# Patient Record
Sex: FEMALE | Race: WHITE | URBAN - METROPOLITAN AREA
[De-identification: names, ages, dates, MRNs, and addresses within clinical notes are randomized per-mention and may not be internally consistent; named-entity substitution may affect disease eponyms.]

---

## 2017-06-05 ENCOUNTER — EMERGENCY (EMERGENCY)
Facility: HOSPITAL | Age: 22
LOS: 1 days | Discharge: HOME | End: 2017-06-05
Admitting: EMERGENCY MEDICINE
Payer: COMMERCIAL

## 2017-06-05 VITALS
TEMPERATURE: 98 F | WEIGHT: 164.91 LBS | HEIGHT: 70 IN | DIASTOLIC BLOOD PRESSURE: 79 MMHG | RESPIRATION RATE: 14 BRPM | HEART RATE: 94 BPM | OXYGEN SATURATION: 100 % | SYSTOLIC BLOOD PRESSURE: 127 MMHG

## 2017-06-05 LAB
ALBUMIN SERPL ELPH-MCNC: 4.7 G/DL — SIGNIFICANT CHANGE UP (ref 3.3–5)
ALP SERPL-CCNC: 51 U/L — SIGNIFICANT CHANGE UP (ref 40–120)
ALT FLD-CCNC: 16 U/L — SIGNIFICANT CHANGE UP (ref 4–33)
APPEARANCE UR: SIGNIFICANT CHANGE UP
AST SERPL-CCNC: 22 U/L — SIGNIFICANT CHANGE UP (ref 4–32)
BACTERIA # UR AUTO: SIGNIFICANT CHANGE UP
BASE EXCESS BLDV CALC-SCNC: 1.6 MMOL/L — SIGNIFICANT CHANGE UP
BASOPHILS # BLD AUTO: 0.04 K/UL — SIGNIFICANT CHANGE UP (ref 0–0.2)
BASOPHILS NFR BLD AUTO: 0.6 % — SIGNIFICANT CHANGE UP (ref 0–2)
BILIRUB SERPL-MCNC: 0.6 MG/DL — SIGNIFICANT CHANGE UP (ref 0.2–1.2)
BILIRUB UR-MCNC: NEGATIVE — SIGNIFICANT CHANGE UP
BLOOD GAS VENOUS - CREATININE: 0.88 MG/DL — SIGNIFICANT CHANGE UP (ref 0.5–1.3)
BLOOD UR QL VISUAL: NEGATIVE — SIGNIFICANT CHANGE UP
BUN SERPL-MCNC: 10 MG/DL — SIGNIFICANT CHANGE UP (ref 7–23)
CALCIUM SERPL-MCNC: 9.3 MG/DL — SIGNIFICANT CHANGE UP (ref 8.4–10.5)
CHLORIDE BLDV-SCNC: 112 MMOL/L — HIGH (ref 96–108)
CHLORIDE SERPL-SCNC: 104 MMOL/L — SIGNIFICANT CHANGE UP (ref 98–107)
CO2 SERPL-SCNC: 23 MMOL/L — SIGNIFICANT CHANGE UP (ref 22–31)
COLOR SPEC: YELLOW — SIGNIFICANT CHANGE UP
CREAT SERPL-MCNC: 0.92 MG/DL — SIGNIFICANT CHANGE UP (ref 0.5–1.3)
EOSINOPHIL # BLD AUTO: 0.1 K/UL — SIGNIFICANT CHANGE UP (ref 0–0.5)
EOSINOPHIL NFR BLD AUTO: 1.4 % — SIGNIFICANT CHANGE UP (ref 0–6)
ERYTHROCYTE [SEDIMENTATION RATE] IN BLOOD: 3 MM/HR — LOW (ref 4–25)
GAS PNL BLDV: 133 MMOL/L — LOW (ref 136–146)
GLUCOSE BLDV-MCNC: 88 — SIGNIFICANT CHANGE UP (ref 70–99)
GLUCOSE SERPL-MCNC: 86 MG/DL — SIGNIFICANT CHANGE UP (ref 70–99)
GLUCOSE UR-MCNC: NEGATIVE — SIGNIFICANT CHANGE UP
HCO3 BLDV-SCNC: 24 MMOL/L — SIGNIFICANT CHANGE UP (ref 20–27)
HCT VFR BLD CALC: 43.3 % — SIGNIFICANT CHANGE UP (ref 34.5–45)
HCT VFR BLDV CALC: 45.8 % — HIGH (ref 34.5–45)
HGB BLD-MCNC: 14.6 G/DL — SIGNIFICANT CHANGE UP (ref 11.5–15.5)
HGB BLDV-MCNC: 14.9 G/DL — SIGNIFICANT CHANGE UP (ref 11.5–15.5)
IMM GRANULOCYTES NFR BLD AUTO: 0.1 % — SIGNIFICANT CHANGE UP (ref 0–1.5)
KETONES UR-MCNC: NEGATIVE — SIGNIFICANT CHANGE UP
LACTATE BLDV-MCNC: 1 MMOL/L — SIGNIFICANT CHANGE UP (ref 0.5–2)
LEUKOCYTE ESTERASE UR-ACNC: NEGATIVE — SIGNIFICANT CHANGE UP
LIDOCAIN IGE QN: 58.8 U/L — SIGNIFICANT CHANGE UP (ref 7–60)
LYMPHOCYTES # BLD AUTO: 2.93 K/UL — SIGNIFICANT CHANGE UP (ref 1–3.3)
LYMPHOCYTES # BLD AUTO: 42.3 % — SIGNIFICANT CHANGE UP (ref 13–44)
MCHC RBC-ENTMCNC: 29 PG — SIGNIFICANT CHANGE UP (ref 27–34)
MCHC RBC-ENTMCNC: 33.7 % — SIGNIFICANT CHANGE UP (ref 32–36)
MCV RBC AUTO: 85.9 FL — SIGNIFICANT CHANGE UP (ref 80–100)
MONOCYTES # BLD AUTO: 0.59 K/UL — SIGNIFICANT CHANGE UP (ref 0–0.9)
MONOCYTES NFR BLD AUTO: 8.5 % — SIGNIFICANT CHANGE UP (ref 2–14)
MUCOUS THREADS # UR AUTO: SIGNIFICANT CHANGE UP
NEUTROPHILS # BLD AUTO: 3.25 K/UL — SIGNIFICANT CHANGE UP (ref 1.8–7.4)
NEUTROPHILS NFR BLD AUTO: 47.1 % — SIGNIFICANT CHANGE UP (ref 43–77)
NITRITE UR-MCNC: NEGATIVE — SIGNIFICANT CHANGE UP
PCO2 BLDV: 47 MMHG — SIGNIFICANT CHANGE UP (ref 41–51)
PH BLDV: 7.37 PH — SIGNIFICANT CHANGE UP (ref 7.32–7.43)
PH UR: 6 — SIGNIFICANT CHANGE UP (ref 4.6–8)
PLATELET # BLD AUTO: 273 K/UL — SIGNIFICANT CHANGE UP (ref 150–400)
PMV BLD: 10.6 FL — SIGNIFICANT CHANGE UP (ref 7–13)
PO2 BLDV: 32 MMHG — LOW (ref 35–40)
POTASSIUM BLDV-SCNC: 3.6 MMOL/L — SIGNIFICANT CHANGE UP (ref 3.4–4.5)
POTASSIUM SERPL-MCNC: 4 MMOL/L — SIGNIFICANT CHANGE UP (ref 3.5–5.3)
POTASSIUM SERPL-SCNC: 4 MMOL/L — SIGNIFICANT CHANGE UP (ref 3.5–5.3)
PROT SERPL-MCNC: 7.4 G/DL — SIGNIFICANT CHANGE UP (ref 6–8.3)
PROT UR-MCNC: 30 — HIGH
RBC # BLD: 5.04 M/UL — SIGNIFICANT CHANGE UP (ref 3.8–5.2)
RBC # FLD: 12.5 % — SIGNIFICANT CHANGE UP (ref 10.3–14.5)
RBC CASTS # UR COMP ASSIST: SIGNIFICANT CHANGE UP (ref 0–?)
SAO2 % BLDV: 53.3 % — LOW (ref 60–85)
SODIUM SERPL-SCNC: 142 MMOL/L — SIGNIFICANT CHANGE UP (ref 135–145)
SP GR SPEC: 1.02 — SIGNIFICANT CHANGE UP (ref 1–1.03)
SQUAMOUS # UR AUTO: SIGNIFICANT CHANGE UP
UROBILINOGEN FLD QL: NORMAL E.U. — SIGNIFICANT CHANGE UP (ref 0.1–0.2)
WBC # BLD: 6.92 K/UL — SIGNIFICANT CHANGE UP (ref 3.8–10.5)
WBC # FLD AUTO: 6.92 K/UL — SIGNIFICANT CHANGE UP (ref 3.8–10.5)
WBC UR QL: SIGNIFICANT CHANGE UP (ref 0–?)

## 2017-06-05 PROCEDURE — 99284 EMERGENCY DEPT VISIT MOD MDM: CPT

## 2017-06-05 PROCEDURE — 76705 ECHO EXAM OF ABDOMEN: CPT | Mod: 26

## 2017-06-05 PROCEDURE — 74177 CT ABD & PELVIS W/CONTRAST: CPT | Mod: 26

## 2017-06-05 RX ORDER — SODIUM CHLORIDE 9 MG/ML
1000 INJECTION INTRAMUSCULAR; INTRAVENOUS; SUBCUTANEOUS ONCE
Qty: 0 | Refills: 0 | Status: COMPLETED | OUTPATIENT
Start: 2017-06-05 | End: 2017-06-05

## 2017-06-05 RX ADMIN — SODIUM CHLORIDE 1000 MILLILITER(S): 9 INJECTION INTRAMUSCULAR; INTRAVENOUS; SUBCUTANEOUS at 13:36

## 2017-06-05 NOTE — ED PROVIDER NOTE - OBJECTIVE STATEMENT
21 yo F no pmhx here for abdominal pain x 5 days. Pt states she has a history of constipation which she had been constipated for the past 5 days. Two days ago had several large bowel movements and then felt better however then pain returned localized to the RLQ. Went to PMD today and was told to go to ED for r/o appendicitis. Pt has been able to tolerate PO both solids and liquids. LMP 5/13. Reports more frequent urination. Otherwise well. Denies fever, chills, vomiting, nausea, diarrhea, weakness, HA, difficulty breathing, SOB, CP, blood in stool, burning with urination, blood in urine.

## 2017-06-05 NOTE — ED PROVIDER NOTE - CARE PLAN
Principal Discharge DX:	Abdominal pain Principal Discharge DX:	Abdominal pain  Instructions for follow-up, activity and diet:	Follow up with GI. Rest, drink plenty of fluids.  Advance activity as tolerated.  Continue all previously prescribed medications as directed. You can use motrin 600mg every 6-8 hours for pain or fever, and/or Tylenol 650 mg every 4 hours for pain/fever. Follow up with your primary care physician in 48-72 hours- bring copies of your results.  Return to the emergency department for chest pain, shortness of breath, dizziness, or worsening, concerning, or persistent symptoms.

## 2017-06-05 NOTE — ED PROVIDER NOTE - CHPI ED SYMPTOMS NEG
no fever/no burning urination/no diarrhea/no vomiting/no nausea/no abdominal distension/no dysuria/no hematuria/no chills/no blood in stool

## 2017-06-05 NOTE — ED PROVIDER NOTE - PROGRESS NOTE DETAILS
TYRESE Cline: pt doing well, has not required meds. Abdominal exam now benign. CT negative. Right corpus leuteal visualized. Otherwise well. Will d/c with GI follow up. Pt and family agree with plan.

## 2017-06-05 NOTE — ED PROVIDER NOTE - PLAN OF CARE
Follow up with GI. Rest, drink plenty of fluids.  Advance activity as tolerated.  Continue all previously prescribed medications as directed. You can use motrin 600mg every 6-8 hours for pain or fever, and/or Tylenol 650 mg every 4 hours for pain/fever. Follow up with your primary care physician in 48-72 hours- bring copies of your results.  Return to the emergency department for chest pain, shortness of breath, dizziness, or worsening, concerning, or persistent symptoms.

## 2017-06-05 NOTE — ED ADULT NURSE NOTE - OBJECTIVE STATEMENT
pt received a&ox3, c/o constipation since Friday and lower abdominal pain, currently denies pain, sent in from pediatricians office, pt denies ha/cp/sob/nv/urinary symptoms, 20 gauge IV placed in left ac, labs drawn and sent, will continue to monitor.

## 2017-06-05 NOTE — ED ADULT TRIAGE NOTE - CHIEF COMPLAINT QUOTE
Pt c/o of diffuse abdominal pain since Wednesday she was sent in by her PMD to R/O AP. Pt appears to be in no acute distress abdomen is soft non tender.

## 2017-06-05 NOTE — ED PROVIDER NOTE - GASTROINTESTINAL, MLM
Abdomen soft, non-tender, no guarding. minimal RLQ tenderness. no rebound. no guarding, no psoas sign.

## 2017-06-06 LAB
BACTERIA UR CULT: SIGNIFICANT CHANGE UP
SPECIMEN SOURCE: SIGNIFICANT CHANGE UP

## 2019-04-18 ENCOUNTER — RECORD ABSTRACTING (OUTPATIENT)
Age: 24
End: 2019-04-18

## 2019-04-18 ENCOUNTER — APPOINTMENT (OUTPATIENT)
Dept: OBGYN | Facility: CLINIC | Age: 24
End: 2019-04-18
Payer: COMMERCIAL

## 2019-04-18 VITALS
BODY MASS INDEX: 29.2 KG/M2 | DIASTOLIC BLOOD PRESSURE: 70 MMHG | HEIGHT: 70 IN | WEIGHT: 204 LBS | SYSTOLIC BLOOD PRESSURE: 122 MMHG

## 2019-04-18 DIAGNOSIS — Z87.898 PERSONAL HISTORY OF OTHER SPECIFIED CONDITIONS: ICD-10-CM

## 2019-04-18 DIAGNOSIS — Z87.42 PERSONAL HISTORY OF OTHER DISEASES OF THE FEMALE GENITAL TRACT: ICD-10-CM

## 2019-04-18 DIAGNOSIS — Z83.3 FAMILY HISTORY OF DIABETES MELLITUS: ICD-10-CM

## 2019-04-18 DIAGNOSIS — Z82.62 FAMILY HISTORY OF OSTEOPOROSIS: ICD-10-CM

## 2019-04-18 DIAGNOSIS — Z82.49 FAMILY HISTORY OF ISCHEMIC HEART DISEASE AND OTHER DISEASES OF THE CIRCULATORY SYSTEM: ICD-10-CM

## 2019-04-18 DIAGNOSIS — Z87.19 PERSONAL HISTORY OF OTHER DISEASES OF THE DIGESTIVE SYSTEM: ICD-10-CM

## 2019-04-18 DIAGNOSIS — Z01.419 ENCOUNTER FOR GYNECOLOGICAL EXAMINATION (GENERAL) (ROUTINE) W/OUT ABNORMAL FINDINGS: ICD-10-CM

## 2019-04-18 DIAGNOSIS — F41.9 ANXIETY DISORDER, UNSPECIFIED: ICD-10-CM

## 2019-04-18 DIAGNOSIS — Z80.3 FAMILY HISTORY OF MALIGNANT NEOPLASM OF BREAST: ICD-10-CM

## 2019-04-18 DIAGNOSIS — Z92.89 PERSONAL HISTORY OF OTHER MEDICAL TREATMENT: ICD-10-CM

## 2019-04-18 PROCEDURE — 81025 URINE PREGNANCY TEST: CPT

## 2019-04-18 PROCEDURE — 81003 URINALYSIS AUTO W/O SCOPE: CPT | Mod: QW

## 2019-04-18 PROCEDURE — 99395 PREV VISIT EST AGE 18-39: CPT

## 2019-04-19 PROBLEM — Z80.3 FAMILY HISTORY OF MALIGNANT NEOPLASM OF BREAST: Status: ACTIVE | Noted: 2019-04-18

## 2019-04-19 PROBLEM — Z82.49 FAMILY HISTORY OF HYPERTENSION: Status: ACTIVE | Noted: 2019-04-18

## 2019-04-19 LAB
BILIRUB UR QL STRIP: NORMAL
C TRACH RRNA SPEC QL NAA+PROBE: NOT DETECTED
GLUCOSE UR-MCNC: NORMAL
HCG UR QL: 1 EU/DL
HCG UR QL: NEGATIVE
HGB UR QL STRIP.AUTO: NORMAL
HPV HIGH+LOW RISK DNA PNL CVX: NOT DETECTED
KETONES UR-MCNC: NORMAL
LEUKOCYTE ESTERASE UR QL STRIP: NORMAL
N GONORRHOEA RRNA SPEC QL NAA+PROBE: NOT DETECTED
NITRITE UR QL STRIP: NORMAL
PH UR STRIP: 7
PROT UR STRIP-MCNC: NORMAL
QUALITY CONTROL: YES
SOURCE AMPLIFICATION: NORMAL
SOURCE TP AMPLIFICATION: NORMAL
SP GR UR STRIP: 1.01
T VAGINALIS RRNA SPEC QL NAA+PROBE: NOT DETECTED

## 2019-04-19 NOTE — PHYSICAL EXAM
[Awake] : awake [Alert] : alert [Mass] : no breast mass [Axillary LAD] : no axillary lymphadenopathy [Nipple Discharge] : no nipple discharge [Distended] : not distended [Tender] : non tender [Soft] : soft [Oriented x3] : oriented to person, place, and time [Normal] : uterus [Motion Tenderness] : there was no cervical motion tenderness [Tenderness] : nontender [Uterine Adnexae] : were not tender and not enlarged [Mass ___ cm] : no uterine mass was palpated [Enlarged ___ wks] : not enlarged [Ovarian Mass (___ Cm)] : there were no adnexal masses [Adnexa Tenderness] : were not tender

## 2019-04-19 NOTE — DISCUSSION/SUMMARY
[FreeTextEntry1] : We discussed the recommendations for breast cancer screening for average risk patients beginning at age 40.  Sima does have a family history of breast cancer, but her mother has never been diagnosed with breast cancer. We discussed based on the current guidelines Sima would not be due for screening mammography until age 40, and generally mammograms are not recommended under 30. Sima and her mother were advised to consult with Dr. Alcazar regarding her recommendations for testing prior to the breast reduction, and they were also offered to establish with a breast surgeon for further discussion regarding Sima's individual breast cancer risk which may modify her screening recommendations for the future.  They were ultimately provided with the rx for the mammo and sonogram- but were advised these are generally not recommended and to wait until her discussion with Dr. Alcazar.\par \par 2.  Sima declined contraceptive options today/denied sexual activity. we discussed the importance of condom use when she does initiate sexual activity.

## 2019-04-23 LAB — CYTOLOGY CVX/VAG DOC THIN PREP: NORMAL

## 2019-05-15 ENCOUNTER — TRANSCRIPTION ENCOUNTER (OUTPATIENT)
Age: 24
End: 2019-05-15

## 2020-01-24 ENCOUNTER — TRANSCRIPTION ENCOUNTER (OUTPATIENT)
Age: 25
End: 2020-01-24

## 2020-01-31 ENCOUNTER — TRANSCRIPTION ENCOUNTER (OUTPATIENT)
Age: 25
End: 2020-01-31

## 2020-05-14 ENCOUNTER — TRANSCRIPTION ENCOUNTER (OUTPATIENT)
Age: 25
End: 2020-05-14

## 2020-06-14 ENCOUNTER — TRANSCRIPTION ENCOUNTER (OUTPATIENT)
Age: 25
End: 2020-06-14

## 2020-12-21 PROBLEM — Z01.419 ENCOUNTER FOR WELL WOMAN EXAM WITH ROUTINE GYNECOLOGICAL EXAM: Status: RESOLVED | Noted: 2019-04-18 | Resolved: 2020-12-21

## 2021-01-06 ENCOUNTER — APPOINTMENT (OUTPATIENT)
Dept: OBGYN | Facility: CLINIC | Age: 26
End: 2021-01-06
Payer: COMMERCIAL

## 2021-01-06 VITALS
DIASTOLIC BLOOD PRESSURE: 72 MMHG | SYSTOLIC BLOOD PRESSURE: 118 MMHG | TEMPERATURE: 97.2 F | BODY MASS INDEX: 35.79 KG/M2 | HEIGHT: 70 IN | WEIGHT: 250 LBS

## 2021-01-06 PROCEDURE — 99214 OFFICE O/P EST MOD 30 MIN: CPT

## 2021-01-06 PROCEDURE — 99072 ADDL SUPL MATRL&STAF TM PHE: CPT

## 2021-01-06 NOTE — HISTORY OF PRESENT ILLNESS
[Last Pap ___] : Last cervical pap smear was [unfilled] [Reproductive Age] : is of reproductive age [Menarche Age: ____] : age at menarche was [unfilled] [Menstrual Problems] : reports abnormal menses [Lighter Bleeding] : bleeding has been lighter than normal [Definite:  ___ (Date)] : the last menstrual period was [unfilled] [Pregnancy History] : denies prior pregnancies [Sexually Active] : is not sexually active [Contraception] : does not use contraception

## 2021-01-06 NOTE — DISCUSSION/SUMMARY
[FreeTextEntry1] : I, Eric Thomas, acted solely as a scribe for Dr. Soriano on this date 01/06/2021.\par All medical record entries made by the Scribe were at my, Dr. Sorinao's direction and personally dictated by me on  01/06/2021. I have reviewed the chart and agree that the record accurately reflects my personal performance of the history, physical exam, assessment and plan. I have also personally directed, reviewed, and agreed with the chart.\par \par

## 2021-01-06 NOTE — CHIEF COMPLAINT
[Urgent Visit] : Urgent Visit [FreeTextEntry1] : Patient presents for evaluation of possible retained tampon

## 2021-01-06 NOTE — REVIEW OF SYSTEMS
[Nl] : Hematologic/Lymphatic [Recent Wt Gain ___ Lbs] : recent [unfilled] ~Ulb weight gain [Headache] : headache [Anxiety] : anxiety [Depression] : depression [Excessive Sweating] : excessive sweating [FreeTextEntry1] : excessive thirst

## 2021-01-06 NOTE — PHYSICAL EXAM
[Awake] : awake [Alert] : alert [Oriented x3] : oriented to person, place, and time [Labia Majora] : labia major [Labia Minora] : labia minora [Normal] : clitoris [Scant] : there was scant vaginal bleeding [Uterine Adnexae] : were not tender and not enlarged [Acute Distress] : no acute distress [Depressed Mood] : not depressed [Flat Affect] : affect not flat [FreeTextEntry4] : currently on her menses, no tampon noted

## 2021-01-20 ENCOUNTER — APPOINTMENT (OUTPATIENT)
Dept: OBGYN | Facility: CLINIC | Age: 26
End: 2021-01-20
Payer: COMMERCIAL

## 2021-01-20 ENCOUNTER — ASOB RESULT (OUTPATIENT)
Age: 26
End: 2021-01-20

## 2021-01-20 VITALS
HEIGHT: 70 IN | BODY MASS INDEX: 36.65 KG/M2 | SYSTOLIC BLOOD PRESSURE: 120 MMHG | TEMPERATURE: 97.7 F | WEIGHT: 256 LBS | DIASTOLIC BLOOD PRESSURE: 68 MMHG

## 2021-01-20 DIAGNOSIS — N83.209 UNSPECIFIED OVARIAN CYST, UNSPECIFIED SIDE: ICD-10-CM

## 2021-01-20 DIAGNOSIS — Z01.419 ENCOUNTER FOR GYNECOLOGICAL EXAMINATION (GENERAL) (ROUTINE) W/OUT ABNORMAL FINDINGS: ICD-10-CM

## 2021-01-20 DIAGNOSIS — Z11.3 ENCOUNTER FOR SCREENING FOR INFECTIONS WITH A PREDOMINANTLY SEXUAL MODE OF TRANSMISSION: ICD-10-CM

## 2021-01-20 DIAGNOSIS — R63.5 ABNORMAL WEIGHT GAIN: ICD-10-CM

## 2021-01-20 DIAGNOSIS — T19.2XXA FOREIGN BODY IN VULVA AND VAGINA, INITIAL ENCOUNTER: ICD-10-CM

## 2021-01-20 DIAGNOSIS — Z78.9 OTHER SPECIFIED HEALTH STATUS: ICD-10-CM

## 2021-01-20 DIAGNOSIS — Z87.42 PERSONAL HISTORY OF OTHER DISEASES OF THE FEMALE GENITAL TRACT: ICD-10-CM

## 2021-01-20 DIAGNOSIS — N62 HYPERTROPHY OF BREAST: ICD-10-CM

## 2021-01-20 DIAGNOSIS — Z12.39 ENCOUNTER FOR OTHER SCREENING FOR MALIGNANT NEOPLASM OF BREAST: ICD-10-CM

## 2021-01-20 DIAGNOSIS — Z12.4 ENCOUNTER FOR SCREENING FOR MALIGNANT NEOPLASM OF CERVIX: ICD-10-CM

## 2021-01-20 PROCEDURE — 99072 ADDL SUPL MATRL&STAF TM PHE: CPT

## 2021-01-20 PROCEDURE — 99395 PREV VISIT EST AGE 18-39: CPT

## 2021-01-20 PROCEDURE — 76856 US EXAM PELVIC COMPLETE: CPT | Mod: 59

## 2021-01-20 PROCEDURE — 76830 TRANSVAGINAL US NON-OB: CPT

## 2021-01-20 RX ORDER — UBIDECARENONE 200 MG
500 CAPSULE ORAL
Refills: 0 | Status: DISCONTINUED | COMMUNITY
End: 2021-01-20

## 2021-01-20 RX ORDER — LACTOBACILLUS ACIDOPHILUS 0.5 MG
TABLET ORAL
Refills: 0 | Status: DISCONTINUED | COMMUNITY
End: 2021-01-20

## 2021-01-20 RX ORDER — FEXOFENADINE HYDROCHLORIDE AND PSEUDOEPHEDRINE HYDROCHLORIDE 180; 240 MG/1; MG/1
180-240 TABLET, FILM COATED, EXTENDED RELEASE ORAL
Refills: 0 | Status: DISCONTINUED | COMMUNITY
End: 2021-01-20

## 2021-01-20 RX ORDER — MULTIVITAMIN
TABLET ORAL
Refills: 0 | Status: ACTIVE | COMMUNITY

## 2021-01-20 RX ORDER — FLUOXETINE HCL 10 MG
TABLET ORAL
Refills: 0 | Status: ACTIVE | COMMUNITY

## 2021-01-20 RX ORDER — CHOLECALCIFEROL (VITAMIN D3) 50 MCG
100 CAPSULE ORAL
Refills: 0 | Status: DISCONTINUED | COMMUNITY
End: 2021-01-20

## 2021-01-20 RX ORDER — OMEPRAZOLE MAGNESIUM 10 MG/1
10 GRANULE, DELAYED RELEASE ORAL
Refills: 0 | Status: ACTIVE | COMMUNITY

## 2021-01-20 NOTE — DISCUSSION/SUMMARY
[FreeTextEntry1] : The benefits of adequate calcium intake and a daily multivitamin along with routine daily cardiovascular exercise were reviewed with the patient.\par \par Self-breast exam reviewed. \par \par We discussed the results of pelvic sonogram which demonstrated anteverted uterus, endo thickness measuring 7.72 mm,RT ovary appears wnl and left ovary has a clear cyst 2.6 cm. She was given a full description of the findings and expressed understanding.\par \par We discussed her weight gain and she admits to stress eating.  She is currently in [facility] program. She notes losing 5 lbs of weight. We also discussed the potential effect of  Prozac on weight gain  She tried to wean off of Prozac, but did not do well\par \par Stress management discussed \par  I recommended her to see a Nutritionist \par \par She will follow up annually and as needed.\par \par During this visit 20 minutes were spent face-to-face with greater than 50% of the time dedicated to counseling.\par \par

## 2021-01-20 NOTE — END OF VISIT
[FreeTextEntry3] : I, Eric Thomas, acted solely as a scribe for Dr. Soriano on this date 01/20/2021.\par All medical record entries made by the Scribe were at my, Dr. Soriano's direction and personally dictated by me on  01/20/2021. I have reviewed the chart and agree that the record accurately reflects my personal performance of the history, physical exam, assessment and plan. I have also personally directed, reviewed, and agreed with the chart.\par \par

## 2021-01-20 NOTE — REVIEW OF SYSTEMS
[Negative] : Heme/Lymph [Fatigue] : fatigue [Recent Weight Gain (___ Lbs)] : recent [unfilled] ~Ulb weight gain [Breast Pain] : breast pain [Headache] : headache [Anxiety] : anxiety [Depression] : depression [de-identified] : excessive sweating and thirst

## 2021-01-20 NOTE — HISTORY OF PRESENT ILLNESS
[Currently Active] : currently active [N] : Patient does not use contraception [Y] : Patient is sexually active [No] : never pregnant [Women] : women [TextBox_4] : Sono and Annual [PapSmeardate] : 4/18/19 [TextBox_31] : negative [LMPDate] : 1/3/21 [PGHxTotal] : 0 [TextBox_29] : n/a [TextBox_36] : n/a [TextBox_6] : 1/3/21 [TextBox_9] : 14 [TextBox_28] : n/a [TextBox_34] : n/a [TextBox_18] : n/a [FreeTextEntry1] : 1/3/21

## 2021-01-21 LAB
C TRACH RRNA SPEC QL NAA+PROBE: NOT DETECTED
HPV HIGH+LOW RISK DNA PNL CVX: NOT DETECTED
N GONORRHOEA RRNA SPEC QL NAA+PROBE: NOT DETECTED
SOURCE TP AMPLIFICATION: NORMAL

## 2021-11-23 ENCOUNTER — TRANSCRIPTION ENCOUNTER (OUTPATIENT)
Age: 26
End: 2021-11-23

## 2022-01-06 ENCOUNTER — TRANSCRIPTION ENCOUNTER (OUTPATIENT)
Age: 27
End: 2022-01-06

## 2022-07-18 ENCOUNTER — NON-APPOINTMENT (OUTPATIENT)
Age: 27
End: 2022-07-18

## 2022-08-02 ENCOUNTER — NON-APPOINTMENT (OUTPATIENT)
Age: 27
End: 2022-08-02

## 2022-08-12 ENCOUNTER — NON-APPOINTMENT (OUTPATIENT)
Age: 27
End: 2022-08-12

## 2023-04-06 ENCOUNTER — OFFICE (OUTPATIENT)
Dept: URBAN - METROPOLITAN AREA CLINIC 12 | Facility: CLINIC | Age: 28
Setting detail: OPHTHALMOLOGY
End: 2023-04-06
Payer: COMMERCIAL

## 2023-04-06 DIAGNOSIS — H53.10: ICD-10-CM

## 2023-04-06 DIAGNOSIS — H43.392: ICD-10-CM

## 2023-04-06 PROBLEM — G43.109 OCULAR MIGRAINE-W/ AURA: Status: ACTIVE | Noted: 2023-04-06

## 2023-04-06 PROBLEM — H35.413 LATTICE DEGENERATION; BOTH EYES: Status: ACTIVE | Noted: 2023-04-06

## 2023-04-06 PROBLEM — H43.393 VITREOUS FLOATERS; BOTH EYES: Status: ACTIVE | Noted: 2023-04-06

## 2023-04-06 PROCEDURE — 92014 COMPRE OPH EXAM EST PT 1/>: CPT | Performed by: OPHTHALMOLOGY

## 2023-04-06 ASSESSMENT — REFRACTION_MANIFEST
OD_SPHERE: -5.25
OD_VA1: 20/20
OD_CYLINDER: -0.50
OD_AXIS: 90
OS_VA1: 20/20
OS_CYLINDER: -0.50
OS_SPHERE: -5.75
OS_AXIS: 65

## 2023-04-06 ASSESSMENT — KERATOMETRY
OD_K2POWER_DIOPTERS: 42.75
OD_K1POWER_DIOPTERS: 42.00
METHOD_AUTO_MANUAL: AUTO
OS_K2POWER_DIOPTERS: 42.75
OS_K1POWER_DIOPTERS: 42.50
OD_AXISANGLE_DEGREES: 030
OS_AXISANGLE_DEGREES: 154

## 2023-04-06 ASSESSMENT — REFRACTION_CURRENTRX
OD_AXIS: 139
OD_SPHERE: -5.75
OS_VPRISM_DIRECTION: SV
OS_CYLINDER: -0.25
OS_SPHERE: -6.00
OS_OVR_VA: 20/
OD_VPRISM_DIRECTION: SV
OD_CYLINDER: -0.50
OD_OVR_VA: 20/
OS_AXIS: 120

## 2023-04-06 ASSESSMENT — CONFRONTATIONAL VISUAL FIELD TEST (CVF)
OD_FINDINGS: FULL
OS_FINDINGS: FULL

## 2023-04-06 ASSESSMENT — REFRACTION_AUTOREFRACTION
OD_AXIS: 097
OD_SPHERE: -5.75
OD_CYLINDER: -1.00
OS_SPHERE: -6.50
OS_CYLINDER: -0.50
OS_AXIS: 072

## 2023-04-06 ASSESSMENT — AXIALLENGTH_DERIVED
OS_AL: 26.9651
OD_AL: 26.8312
OD_AL: 26.4585
OS_AL: 26.5887

## 2023-04-06 ASSESSMENT — TONOMETRY
OD_IOP_MMHG: 16
OS_IOP_MMHG: 16

## 2023-04-06 ASSESSMENT — SPHEQUIV_DERIVED
OS_SPHEQUIV: -6.75
OD_SPHEQUIV: -5.5
OS_SPHEQUIV: -6
OD_SPHEQUIV: -6.25

## 2023-04-06 ASSESSMENT — LID EXAM ASSESSMENTS
OD_BLEPHARITIS: +1
OS_BLEPHARITIS: +1

## 2023-04-06 ASSESSMENT — VISUAL ACUITY
OS_BCVA: 20/20
OD_BCVA: 20/20

## 2023-05-13 ENCOUNTER — EMERGENCY (EMERGENCY)
Facility: HOSPITAL | Age: 28
LOS: 0 days | Discharge: HOME | End: 2023-05-13
Attending: EMERGENCY MEDICINE
Payer: COMMERCIAL

## 2023-05-13 VITALS
OXYGEN SATURATION: 100 % | HEART RATE: 88 BPM | DIASTOLIC BLOOD PRESSURE: 81 MMHG | RESPIRATION RATE: 18 BRPM | TEMPERATURE: 100 F | SYSTOLIC BLOOD PRESSURE: 130 MMHG

## 2023-05-13 VITALS — WEIGHT: 227.96 LBS

## 2023-05-13 DIAGNOSIS — K42.9 UMBILICAL HERNIA WITHOUT OBSTRUCTION OR GANGRENE: ICD-10-CM

## 2023-05-13 DIAGNOSIS — R16.1 SPLENOMEGALY, NOT ELSEWHERE CLASSIFIED: ICD-10-CM

## 2023-05-13 DIAGNOSIS — R59.9 ENLARGED LYMPH NODES, UNSPECIFIED: ICD-10-CM

## 2023-05-13 DIAGNOSIS — R74.8 ABNORMAL LEVELS OF OTHER SERUM ENZYMES: ICD-10-CM

## 2023-05-13 DIAGNOSIS — R59.0 LOCALIZED ENLARGED LYMPH NODES: ICD-10-CM

## 2023-05-13 DIAGNOSIS — R10.11 RIGHT UPPER QUADRANT PAIN: ICD-10-CM

## 2023-05-13 LAB
ALBUMIN SERPL ELPH-MCNC: 3.9 G/DL — SIGNIFICANT CHANGE UP (ref 3.3–5)
ALP SERPL-CCNC: 89 U/L — SIGNIFICANT CHANGE UP (ref 40–120)
ALT FLD-CCNC: 57 U/L — SIGNIFICANT CHANGE UP (ref 12–78)
ANION GAP SERPL CALC-SCNC: 4 MMOL/L — LOW (ref 5–17)
APPEARANCE UR: CLEAR — SIGNIFICANT CHANGE UP
AST SERPL-CCNC: 73 U/L — HIGH (ref 15–37)
BACTERIA # UR AUTO: ABNORMAL
BASOPHILS # BLD AUTO: 0 K/UL — SIGNIFICANT CHANGE UP (ref 0–0.2)
BASOPHILS NFR BLD AUTO: 0 % — SIGNIFICANT CHANGE UP (ref 0–2)
BILIRUB SERPL-MCNC: 0.6 MG/DL — SIGNIFICANT CHANGE UP (ref 0.2–1.2)
BILIRUB UR-MCNC: NEGATIVE — SIGNIFICANT CHANGE UP
BUN SERPL-MCNC: 7 MG/DL — SIGNIFICANT CHANGE UP (ref 7–23)
CALCIUM SERPL-MCNC: 9.3 MG/DL — SIGNIFICANT CHANGE UP (ref 8.5–10.1)
CHLORIDE SERPL-SCNC: 109 MMOL/L — HIGH (ref 96–108)
CO2 SERPL-SCNC: 26 MMOL/L — SIGNIFICANT CHANGE UP (ref 22–31)
COLOR SPEC: YELLOW — SIGNIFICANT CHANGE UP
COMMENT - URINE: SIGNIFICANT CHANGE UP
CREAT SERPL-MCNC: 0.88 MG/DL — SIGNIFICANT CHANGE UP (ref 0.5–1.3)
DIFF PNL FLD: ABNORMAL
EGFR: 93 ML/MIN/1.73M2 — SIGNIFICANT CHANGE UP
EOSINOPHIL # BLD AUTO: 0 K/UL — SIGNIFICANT CHANGE UP (ref 0–0.5)
EOSINOPHIL NFR BLD AUTO: 0 % — SIGNIFICANT CHANGE UP (ref 0–6)
EPI CELLS # UR: ABNORMAL
GLUCOSE SERPL-MCNC: 90 MG/DL — SIGNIFICANT CHANGE UP (ref 70–99)
GLUCOSE UR QL: NEGATIVE — SIGNIFICANT CHANGE UP
HCG SERPL-ACNC: <1 MIU/ML — SIGNIFICANT CHANGE UP
HCT VFR BLD CALC: 41.2 % — SIGNIFICANT CHANGE UP (ref 34.5–45)
HGB BLD-MCNC: 13.8 G/DL — SIGNIFICANT CHANGE UP (ref 11.5–15.5)
KETONES UR-MCNC: NEGATIVE — SIGNIFICANT CHANGE UP
LEUKOCYTE ESTERASE UR-ACNC: NEGATIVE — SIGNIFICANT CHANGE UP
LIDOCAIN IGE QN: 373 U/L — SIGNIFICANT CHANGE UP (ref 73–393)
LYMPHOCYTES # BLD AUTO: 4.8 K/UL — HIGH (ref 1–3.3)
LYMPHOCYTES # BLD AUTO: 67 % — HIGH (ref 13–44)
LYMPHOCYTES # SPEC AUTO: 1 % — HIGH (ref 0–0)
MANUAL SMEAR VERIFICATION: SIGNIFICANT CHANGE UP
MCHC RBC-ENTMCNC: 28.3 PG — SIGNIFICANT CHANGE UP (ref 27–34)
MCHC RBC-ENTMCNC: 33.5 GM/DL — SIGNIFICANT CHANGE UP (ref 32–36)
MCV RBC AUTO: 84.6 FL — SIGNIFICANT CHANGE UP (ref 80–100)
MONOCYTES # BLD AUTO: 0.57 K/UL — SIGNIFICANT CHANGE UP (ref 0–0.9)
MONOCYTES NFR BLD AUTO: 8 % — SIGNIFICANT CHANGE UP (ref 2–14)
NEUTROPHILS # BLD AUTO: 1.22 K/UL — LOW (ref 1.8–7.4)
NEUTROPHILS NFR BLD AUTO: 16 % — LOW (ref 43–77)
NEUTS BAND # BLD: 1 % — SIGNIFICANT CHANGE UP (ref 0–8)
NITRITE UR-MCNC: NEGATIVE — SIGNIFICANT CHANGE UP
NRBC # BLD: 0 /100 — SIGNIFICANT CHANGE UP (ref 0–0)
NRBC # BLD: SIGNIFICANT CHANGE UP /100 WBCS (ref 0–0)
PH UR: 7 — SIGNIFICANT CHANGE UP (ref 5–8)
PLAT MORPH BLD: NORMAL — SIGNIFICANT CHANGE UP
PLATELET # BLD AUTO: 179 K/UL — SIGNIFICANT CHANGE UP (ref 150–400)
POTASSIUM SERPL-MCNC: 4 MMOL/L — SIGNIFICANT CHANGE UP (ref 3.5–5.3)
POTASSIUM SERPL-SCNC: 4 MMOL/L — SIGNIFICANT CHANGE UP (ref 3.5–5.3)
PROT SERPL-MCNC: 8 GM/DL — SIGNIFICANT CHANGE UP (ref 6–8.3)
PROT UR-MCNC: NEGATIVE — SIGNIFICANT CHANGE UP
RBC # BLD: 4.87 M/UL — SIGNIFICANT CHANGE UP (ref 3.8–5.2)
RBC # FLD: 13.1 % — SIGNIFICANT CHANGE UP (ref 10.3–14.5)
RBC BLD AUTO: SIGNIFICANT CHANGE UP
RBC CASTS # UR COMP ASSIST: SIGNIFICANT CHANGE UP /HPF (ref 0–4)
SODIUM SERPL-SCNC: 139 MMOL/L — SIGNIFICANT CHANGE UP (ref 135–145)
SP GR SPEC: 1.01 — SIGNIFICANT CHANGE UP (ref 1.01–1.02)
UROBILINOGEN FLD QL: NEGATIVE — SIGNIFICANT CHANGE UP
VARIANT LYMPHS # BLD: 7 % — HIGH (ref 0–6)
WBC # BLD: 7.17 K/UL — SIGNIFICANT CHANGE UP (ref 3.8–10.5)
WBC # FLD AUTO: 7.17 K/UL — SIGNIFICANT CHANGE UP (ref 3.8–10.5)
WBC UR QL: ABNORMAL /HPF (ref 0–5)

## 2023-05-13 PROCEDURE — 36415 COLL VENOUS BLD VENIPUNCTURE: CPT

## 2023-05-13 PROCEDURE — 84702 CHORIONIC GONADOTROPIN TEST: CPT

## 2023-05-13 PROCEDURE — 76830 TRANSVAGINAL US NON-OB: CPT

## 2023-05-13 PROCEDURE — 86308 HETEROPHILE ANTIBODY SCREEN: CPT

## 2023-05-13 PROCEDURE — 80053 COMPREHEN METABOLIC PANEL: CPT

## 2023-05-13 PROCEDURE — 83690 ASSAY OF LIPASE: CPT

## 2023-05-13 PROCEDURE — 93975 VASCULAR STUDY: CPT

## 2023-05-13 PROCEDURE — 81001 URINALYSIS AUTO W/SCOPE: CPT

## 2023-05-13 PROCEDURE — 99284 EMERGENCY DEPT VISIT MOD MDM: CPT

## 2023-05-13 PROCEDURE — 74177 CT ABD & PELVIS W/CONTRAST: CPT | Mod: MA

## 2023-05-13 PROCEDURE — 74177 CT ABD & PELVIS W/CONTRAST: CPT | Mod: 26,MA

## 2023-05-13 PROCEDURE — 93975 VASCULAR STUDY: CPT | Mod: 26

## 2023-05-13 PROCEDURE — 99285 EMERGENCY DEPT VISIT HI MDM: CPT | Mod: 25

## 2023-05-13 PROCEDURE — 85025 COMPLETE CBC W/AUTO DIFF WBC: CPT

## 2023-05-13 PROCEDURE — 76830 TRANSVAGINAL US NON-OB: CPT | Mod: 26

## 2023-05-13 RX ORDER — SODIUM CHLORIDE 9 MG/ML
1000 INJECTION INTRAMUSCULAR; INTRAVENOUS; SUBCUTANEOUS ONCE
Refills: 0 | Status: COMPLETED | OUTPATIENT
Start: 2023-05-13 | End: 2023-05-13

## 2023-05-13 RX ADMIN — SODIUM CHLORIDE 1000 MILLILITER(S): 9 INJECTION INTRAMUSCULAR; INTRAVENOUS; SUBCUTANEOUS at 14:13

## 2023-05-13 NOTE — ED ADULT NURSE NOTE - OBJECTIVE STATEMENT
28 year old female found sitting on stretcher complaining of abdominal pain x4 days.  worsening after eating.  pt recently started weight loss injections.

## 2023-05-13 NOTE — ED STATDOCS - CARE PROVIDER_API CALL
Tawanna Angel  GASTROENTEROLOGY  [address]  [place] [address]  Phone: [phone]  Fax: [phone]  Follow Up Time: Urgent

## 2023-05-13 NOTE — ED ADULT NURSE NOTE - NSFALLUNIVINTERV_ED_ALL_ED
Bed/Stretcher in lowest position, wheels locked, appropriate side rails in place/Call bell, personal items and telephone in reach/Instruct patient to call for assistance before getting out of bed/chair/stretcher/Non-slip footwear applied when patient is off stretcher/[place] to call system/Physically safe environment - no spills, clutter or unnecessary equipment/Purposeful proactive rounding/Room/bathroom lighting operational, light cord in reach

## 2023-05-13 NOTE — ED STATDOCS - ATTENDING APP SHARED VISIT CONTRIBUTION OF CARE
I,Eber Gottlieb MD,  performed the initial face to face bedside interview with this patient regarding history of present illness, review of symptoms and relevant past medical, social and family history.  I completed an independent physical examination.  I was the initial provider who evaluated this patient. I have signed out the follow up of any pending tests (i.e. labs, radiological studies) to the ACP.  I have communicated the patient’s plan of care and disposition with the ACP.  The history, relevant review of systems, past medical and surgical history, medical decision making, and physical examination was documented by the scribe in my presence and I attest to the accuracy of the documentation.

## 2023-05-13 NOTE — ED ADULT TRIAGE NOTE - CHIEF COMPLAINT QUOTE
PT C/O RUQ PAIN  WORSENING AFTER EATING X 4 DAYS. DENIES CP/SOB/N/V/D/FEVER/CHILLS. PT NEWLY STARTED ON WEGOVY FOR WEIGHT LOSS X 2 WEEKS. NO SIGNIFICANT pmh.

## 2023-05-13 NOTE — ED STATDOCS - NSFOLLOWUPINSTRUCTIONS_ED_ALL_ED_FT
Enlarged Spleen  A person's spleen and surrounding internal organs.  An enlarged spleen (splenomegaly) is when the spleen is larger than normal. The spleen is an organ that is located in the upper left area of the abdomen, just under the ribs. The spleen is like a storage unit for red blood cells, and it also works to filter and clean the blood. It destroys cells that are damaged or worn out. The spleen is also important for fighting disease.    This condition is usually noticed when the spleen is almost twice its normal size. An enlarged spleen is usually a sign of another health problem.    What are the causes?  This condition may be caused by:  Mononucleosis and other viral infections.  Infection with certain bacteria or parasites.  Liver failure and other liver diseases.  Blood diseases, such as hemolytic anemia.  Blood cancers, such as leukemia or Hodgkin's disease.  Other causes include:  Tumors and fluid-filled sacs (cysts).  Metabolic disorders, such as Gaucher disease or [place] disease.  Pressure or blood clots in the veins of the spleen.  Connective tissue disorders, such as lupus or rheumatoid arthritis.  What are the signs or symptoms?  Symptoms of this condition include:  Pain in the upper left part of the abdomen. The pain may spread to the left shoulder or get worse when you take a breath.  Feeling full without eating or after eating only a small amount.  Feeling tired.  Chronic infections.  Bleeding or bruising easily.  In some cases, there are no symptoms.    How is this diagnosed?  This condition is diagnosed based on:  A physical exam. Your health care provider will feel the left upper part of your abdomen.  Tests, such as:  Blood tests to check red and white blood cells and other proteins and enzymes.  A biopsy. During a biopsy, a tissue sample of the liver or bone marrow may be removed and looked at under a microscope. A biopsy may be done if there is concern that the liver or bone marrow is causing the enlarged spleen.  An abdominal ultrasound.  A CT scan.  An MRI.  How is this treated?  Treatment for this condition depends on the cause. Treatment aims to:  Manage the conditions that cause enlargement of the spleen.  Reduce the size of the spleen.  Treatment may include:  Medicines to treat infection or disease.  Radiation therapy.  Blood transfusions.  If these treatments do not help or if the cause cannot be found, surgery to remove the spleen (splenectomy) may be recommended. After surgery, you may need to have vaccinations or take antibiotics to prevent infections.    Follow these instructions at home:  Medicines    Take over-the-counter and prescription medicines only as told by your health care provider.  If you were prescribed an antibiotic medicine, take it as told by your health care provider. Do not stop taking the antibiotic even if you start to feel better.  Talk with your health care provider about whether you need vaccinations to help prevent infections. This may be needed if treatment included surgery to remove the spleen. Not having a spleen makes certain infections more dangerous because it weakens the body's disease-fighting system (immune system).  General instructions    Follow instructions from your health care provider about limiting your activities. To avoid injury or a ruptured spleen, make sure you:  Avoid contact sports.  Wear a seat belt in the car.  Keep all follow-up visits as told by your health care provider. This is important.  Contact a health care provider if:  Your symptoms do not improve as expected.  You have a fever or chills.  You feel generally ill.  You have increased pain when you take in a breath.  Get help right away if you:  Experience an injury or impact to the spleen area.  Have abdominal pain that becomes severe.  Feel dizzy or you faint.  Feel very weak.  Have cold and clammy skin.  Have sweating for no reason.  Have chest pain or difficulty breathing.  Summary  An enlarged spleen (splenomegaly) is when the spleen is larger than normal.  An enlarged spleen is usually a sign of another health problem.  This condition is treated with medicines, radiation therapy, blood transfusions, vaccines, or surgery.  This information is not intended to replace advice given to you by your health care provider. Make sure you discuss any questions you have with your health care provider.

## 2023-05-13 NOTE — ED STATDOCS - PATIENT PORTAL LINK FT
You can access the FollowMyHealth Patient Portal offered by [facility] by registering at the following website: [email or web address]. By joining [facility]’s FollowMyHealth portal, you will also be able to view your health information using other applications (apps) compatible with our system.

## 2023-05-13 NOTE — ED STATDOCS - OBJECTIVE STATEMENT
27 y/o female with no significant PMHx presents to the ED c/o RUQ pain worsening after eating and drinking water x5 days. Newly started on Wegovy for weight loss since February lost 40 pounds since then. Pt mother at bedside states last period was very heavy compared to baseline. Went to family doctor yesterday who took blood work and recommended gyno follow up. Gyno recommended coming to ED for evaluation. Pt period still ongoing at this time.

## 2023-05-13 NOTE — ED STATDOCS - PHYSICAL EXAMINATION
Constitutional: NAD AAOx3  Eyes: PERRLA EOMI  Head: Normocephalic atraumatic  Mouth: MMM  Cardiac: regular rate   Resp: Lungs CTAB  GI: Abd s/nd, no rebound or guarding. RLQ tenderness no rebound no guarding  Neuro: awake, alert, moving all extremities, cranial nerves 2-12 intact, sensation intact, no dysmetria.  Skin: No rashes

## 2023-05-13 NOTE — ED STATDOCS - ADDITIONAL NOTES AND INSTRUCTIONS:
Patient Name: Mario Garcia    : 1955    SCREENING QUESTIONNAIRE FOR ADULT IMMUNIZATION    For patients: The following questions will help us determine which vaccines you may be given today. If you answer \"yes\" to any question it does not necessarily mean you should not be vaccinated. It just means additional questions must be asked. If a question is not clear, please ask your healthcare provider to explain it.    1. Are you sick today? No     2. Do you have allergies to medications, food or any vaccines?  No    3. Have you ever had a serious reaction after receiving a vaccination?  No    4. Do you have a long-term health problem with heart disease, lung disease, asthma, kidney disease, metabolic disease (e.g., Diabetes), anemia or other blood disorder?  No       5. Do you have cancer, leukemia, AIDS or any other immune system problem?   No    6. Do you take cortisone, prednisone, other steroids or anti-cancer drugs or have you had any x-ray treatment?  No    7. Have you had a seizure, brain or other nervous system problem? No     8. During the past year have you received a transfusion of blood or blood products or been given a medicine called immune (gamma) globulin?  No    9. For women:  Are you pregnant or is there a chance you could become pregnant during the next month?  Male patient    10. Have you received any vaccinations in the past 4 weeks?   No    Form completed by: Gabreyela Wiese, CMA    Date: 22  Form reviewed by:   Gabreyela Wiese, CMA   Date: 22    Did you bring your immunization record card with you?  Yes No    It is important for you to have a personal record of your vaccinations. If you don't have a personal record, ask your healthcare provider to give you one. Keep this record in a safe place and bring it with you every time you seek medical care. Make sure your healthcare provider records all your vaccinations on it.               no gym/sports until cleared by PMD or specialist.

## 2023-05-13 NOTE — ED STATDOCS - PROGRESS NOTE DETAILS
pt aware of all her results and will fu with her monospot results and will fu with GI. pt agrees with plan and well appearing on dc. -Eliz Baptiste PA-C

## 2023-05-14 ENCOUNTER — TRANSCRIPTION ENCOUNTER (OUTPATIENT)
Age: 28
End: 2023-05-14

## 2023-05-14 LAB — HETEROPH AB TITR SER AGGL: POSITIVE

## 2023-05-15 NOTE — ED POST DISCHARGE NOTE - DETAILS
Pt called and discussed results. Pt advised to avoid and physical activity. Pt will follow up with pmd in 48 hours. Pt given strict precautions to return if any worsening symptoms.

## 2023-08-29 ENCOUNTER — EMERGENCY (EMERGENCY)
Facility: HOSPITAL | Age: 28
LOS: 0 days | Discharge: HOME | End: 2023-08-29
Attending: STUDENT IN AN ORGANIZED HEALTH CARE EDUCATION/TRAINING PROGRAM
Payer: COMMERCIAL

## 2023-08-29 VITALS
DIASTOLIC BLOOD PRESSURE: 81 MMHG | TEMPERATURE: 98 F | HEART RATE: 60 BPM | RESPIRATION RATE: 18 BRPM | SYSTOLIC BLOOD PRESSURE: 115 MMHG | OXYGEN SATURATION: 100 %

## 2023-08-29 VITALS
OXYGEN SATURATION: 100 % | SYSTOLIC BLOOD PRESSURE: 118 MMHG | DIASTOLIC BLOOD PRESSURE: 86 MMHG | RESPIRATION RATE: 18 BRPM | TEMPERATURE: 99 F | HEART RATE: 71 BPM

## 2023-08-29 DIAGNOSIS — R06.02 SHORTNESS OF BREATH: ICD-10-CM

## 2023-08-29 DIAGNOSIS — Z82.49 FAMILY HISTORY OF ISCHEMIC HEART DISEASE AND OTHER DISEASES OF THE CIRCULATORY SYSTEM: ICD-10-CM

## 2023-08-29 DIAGNOSIS — Z20.822 CONTACT WITH AND (SUSPECTED) EXPOSURE TO COVID-19: ICD-10-CM

## 2023-08-29 DIAGNOSIS — F32.A DEPRESSION, UNSPECIFIED: ICD-10-CM

## 2023-08-29 DIAGNOSIS — R07.89 OTHER CHEST PAIN: ICD-10-CM

## 2023-08-29 DIAGNOSIS — Z86.39 PERSONAL HISTORY OF OTHER ENDOCRINE, NUTRITIONAL AND METABOLIC DISEASE: ICD-10-CM

## 2023-08-29 PROBLEM — Z78.9 OTHER SPECIFIED HEALTH STATUS: Chronic | Status: ACTIVE | Noted: 2023-05-13

## 2023-08-29 LAB
ALBUMIN SERPL ELPH-MCNC: 4.1 G/DL — SIGNIFICANT CHANGE UP (ref 3.3–5)
ALP SERPL-CCNC: 57 U/L — SIGNIFICANT CHANGE UP (ref 40–120)
ALT FLD-CCNC: 21 U/L — SIGNIFICANT CHANGE UP (ref 12–78)
ANION GAP SERPL CALC-SCNC: 4 MMOL/L — LOW (ref 5–17)
APPEARANCE UR: CLEAR — SIGNIFICANT CHANGE UP
AST SERPL-CCNC: 19 U/L — SIGNIFICANT CHANGE UP (ref 15–37)
BACTERIA # UR AUTO: ABNORMAL
BASOPHILS # BLD AUTO: 0.05 K/UL — SIGNIFICANT CHANGE UP (ref 0–0.2)
BASOPHILS NFR BLD AUTO: 0.7 % — SIGNIFICANT CHANGE UP (ref 0–2)
BILIRUB SERPL-MCNC: 0.5 MG/DL — SIGNIFICANT CHANGE UP (ref 0.2–1.2)
BILIRUB UR-MCNC: NEGATIVE — SIGNIFICANT CHANGE UP
BUN SERPL-MCNC: 11 MG/DL — SIGNIFICANT CHANGE UP (ref 7–23)
CALCIUM SERPL-MCNC: 9.7 MG/DL — SIGNIFICANT CHANGE UP (ref 8.5–10.1)
CHLORIDE SERPL-SCNC: 109 MMOL/L — HIGH (ref 96–108)
CO2 SERPL-SCNC: 27 MMOL/L — SIGNIFICANT CHANGE UP (ref 22–31)
COLOR SPEC: YELLOW — SIGNIFICANT CHANGE UP
CREAT SERPL-MCNC: 0.84 MG/DL — SIGNIFICANT CHANGE UP (ref 0.5–1.3)
D DIMER BLD IA.RAPID-MCNC: <150 NG/ML DDU — SIGNIFICANT CHANGE UP
DIFF PNL FLD: ABNORMAL
EGFR: 98 ML/MIN/1.73M2 — SIGNIFICANT CHANGE UP
EOSINOPHIL # BLD AUTO: 0.04 K/UL — SIGNIFICANT CHANGE UP (ref 0–0.5)
EOSINOPHIL NFR BLD AUTO: 0.6 % — SIGNIFICANT CHANGE UP (ref 0–6)
EPI CELLS # UR: SIGNIFICANT CHANGE UP
FLUAV AG NPH QL: SIGNIFICANT CHANGE UP
FLUBV AG NPH QL: SIGNIFICANT CHANGE UP
GLUCOSE SERPL-MCNC: 83 MG/DL — SIGNIFICANT CHANGE UP (ref 70–99)
GLUCOSE UR QL: NEGATIVE — SIGNIFICANT CHANGE UP
HCT VFR BLD CALC: 42.9 % — SIGNIFICANT CHANGE UP (ref 34.5–45)
HGB BLD-MCNC: 14.4 G/DL — SIGNIFICANT CHANGE UP (ref 11.5–15.5)
IMM GRANULOCYTES NFR BLD AUTO: 0.1 % — SIGNIFICANT CHANGE UP (ref 0–0.9)
KETONES UR-MCNC: ABNORMAL
LEUKOCYTE ESTERASE UR-ACNC: NEGATIVE — SIGNIFICANT CHANGE UP
LYMPHOCYTES # BLD AUTO: 2.18 K/UL — SIGNIFICANT CHANGE UP (ref 1–3.3)
LYMPHOCYTES # BLD AUTO: 32.2 % — SIGNIFICANT CHANGE UP (ref 13–44)
MCHC RBC-ENTMCNC: 29.2 PG — SIGNIFICANT CHANGE UP (ref 27–34)
MCHC RBC-ENTMCNC: 33.6 GM/DL — SIGNIFICANT CHANGE UP (ref 32–36)
MCV RBC AUTO: 87 FL — SIGNIFICANT CHANGE UP (ref 80–100)
MONOCYTES # BLD AUTO: 0.47 K/UL — SIGNIFICANT CHANGE UP (ref 0–0.9)
MONOCYTES NFR BLD AUTO: 6.9 % — SIGNIFICANT CHANGE UP (ref 2–14)
NEUTROPHILS # BLD AUTO: 4.03 K/UL — SIGNIFICANT CHANGE UP (ref 1.8–7.4)
NEUTROPHILS NFR BLD AUTO: 59.5 % — SIGNIFICANT CHANGE UP (ref 43–77)
NITRITE UR-MCNC: NEGATIVE — SIGNIFICANT CHANGE UP
PH UR: 7 — SIGNIFICANT CHANGE UP (ref 5–8)
PLATELET # BLD AUTO: 366 K/UL — SIGNIFICANT CHANGE UP (ref 150–400)
POTASSIUM SERPL-MCNC: 3.8 MMOL/L — SIGNIFICANT CHANGE UP (ref 3.5–5.3)
POTASSIUM SERPL-SCNC: 3.8 MMOL/L — SIGNIFICANT CHANGE UP (ref 3.5–5.3)
PROT SERPL-MCNC: 7.6 GM/DL — SIGNIFICANT CHANGE UP (ref 6–8.3)
PROT UR-MCNC: NEGATIVE — SIGNIFICANT CHANGE UP
RBC # BLD: 4.93 M/UL — SIGNIFICANT CHANGE UP (ref 3.8–5.2)
RBC # FLD: 12.8 % — SIGNIFICANT CHANGE UP (ref 10.3–14.5)
RBC CASTS # UR COMP ASSIST: ABNORMAL /HPF (ref 0–4)
RSV RNA NPH QL NAA+NON-PROBE: SIGNIFICANT CHANGE UP
SARS-COV-2 RNA SPEC QL NAA+PROBE: SIGNIFICANT CHANGE UP
SODIUM SERPL-SCNC: 140 MMOL/L — SIGNIFICANT CHANGE UP (ref 135–145)
SP GR SPEC: 1.01 — SIGNIFICANT CHANGE UP (ref 1.01–1.02)
TROPONIN I, HIGH SENSITIVITY RESULT: <3 NG/L — SIGNIFICANT CHANGE UP
TSH SERPL-MCNC: 1.8 UU/ML — SIGNIFICANT CHANGE UP (ref 0.34–4.82)
UROBILINOGEN FLD QL: NEGATIVE — SIGNIFICANT CHANGE UP
WBC # BLD: 6.78 K/UL — SIGNIFICANT CHANGE UP (ref 3.8–10.5)
WBC # FLD AUTO: 6.78 K/UL — SIGNIFICANT CHANGE UP (ref 3.8–10.5)
WBC UR QL: SIGNIFICANT CHANGE UP /HPF (ref 0–5)

## 2023-08-29 PROCEDURE — 93010 ELECTROCARDIOGRAM REPORT: CPT

## 2023-08-29 PROCEDURE — 85379 FIBRIN DEGRADATION QUANT: CPT

## 2023-08-29 PROCEDURE — 85025 COMPLETE CBC W/AUTO DIFF WBC: CPT

## 2023-08-29 PROCEDURE — 71046 X-RAY EXAM CHEST 2 VIEWS: CPT

## 2023-08-29 PROCEDURE — 84484 ASSAY OF TROPONIN QUANT: CPT

## 2023-08-29 PROCEDURE — 93005 ELECTROCARDIOGRAM TRACING: CPT

## 2023-08-29 PROCEDURE — 84443 ASSAY THYROID STIM HORMONE: CPT

## 2023-08-29 PROCEDURE — 80053 COMPREHEN METABOLIC PANEL: CPT

## 2023-08-29 PROCEDURE — 99285 EMERGENCY DEPT VISIT HI MDM: CPT

## 2023-08-29 PROCEDURE — 36415 COLL VENOUS BLD VENIPUNCTURE: CPT

## 2023-08-29 PROCEDURE — 99285 EMERGENCY DEPT VISIT HI MDM: CPT | Mod: 25

## 2023-08-29 PROCEDURE — 0241U: CPT

## 2023-08-29 PROCEDURE — 81001 URINALYSIS AUTO W/SCOPE: CPT

## 2023-08-29 PROCEDURE — 71046 X-RAY EXAM CHEST 2 VIEWS: CPT | Mod: 26

## 2023-08-29 NOTE — ED STATDOCS - ATTENDING APP SHARED VISIT CONTRIBUTION OF CARE
I, Gurpreet Domínguez DO, personally saw the patient with ACP.  I have personally performed a face to face diagnostic evaluation on this patient.  I have reviewed the ACP note and agree with the history, exam, and plan of care, except as noted.   The initial assessment was performed by myself and then the patient was handed off to the ACP. The patient was followed and re-evaluated by the ACP. All labs, imaging and procedures were evaluated and performed by the ACP and I was available for consultation if any questions in the patients care came up.

## 2023-08-29 NOTE — ED STATDOCS - NSFOLLOWUPINSTRUCTIONS_ED_ALL_ED_FT
Can try Pepcid (20mg) every 12 hours.    Decrease Caffeine in coffee, sodas, etc.      Eat small meals frequently.      Do not lie down for 3 hours after eating.

## 2023-08-29 NOTE — ED ADULT NURSE NOTE - BREATHING, MLM
Problem: Patient/Family Goals  Goal: Patient/Family Long Term Goal  Description  Patient's Long Term Goal: home at baseline health    Interventions:  - f/u with pcp  -take medications as prescribed by physician    - See additional Care Plan goals for spe oxygen saturation or ABGs  - Provide Smoking Cessation handout, if applicable  - Encourage broncho-pulmonary hygiene including cough, deep breathe, Incentive Spirometry  - Assess the need for suctioning and perform as needed  - Assess and instruct to repor pt/family on patient safety including physical limitations  - Instruct pt to call for assistance with activity based on assessment  - Modify environment to reduce risk of injury  - Provide assistive devices as appropriate  - Consider OT/PT consult to joseph Spontaneous, unlabored and symmetrical

## 2023-08-29 NOTE — ED STATDOCS - PHYSICAL EXAMINATION
General: Patient in no acute distress, AAOX3.   HENMT: NC/AT, no nasal congestion, MMM  Neck: supple  CVS: regular rate and rhythm, no murmur  Resp: Good air entry bilaterally, No wheeze/rhonchi.  Abd: Soft non tender, non distended, +bowel sounds, No guarding, rebound tenderness   Ext: FROM in all ext, 2+ pulses throughout, cap refill<2 sec.  BACK: no midline tenderness, no stepoffs, no CVA ttp  NEURO: no focal deficit, gross motor and sensory intact throughout, gait stable.

## 2023-08-29 NOTE — ED STATDOCS - NS_ ATTENDINGSCRIBEDETAILS _ED_A_ED_FT
I, Gurpreet Domínguez DO,  performed the initial face to face bedside interview with this patient regarding history of present illness, review of symptoms and relevant past medical, social and family history.  I completed an independent physical examination.   The history, relevant review of systems, past medical and surgical history, medical decision making, and physical examination was documented by the scribe in my presence and I attest to the accuracy of the documentation.

## 2023-08-29 NOTE — ED STATDOCS - OBJECTIVE STATEMENT
27 y/o female w/PMHx of obesity, depression on Prozac and Wegovy presents to ED c/o 1 week of chest tightness and SOB intermittently. Pt became concern this afternoon, was at the gym riding on the biker and within 5 minutes she had chest tightness, SOB, and sharp left sided chest pain that last for approx. 5 minutes, improved once she stopped exercising. No associated diaphoresis, N/V, fatigue, radiating pain, numbness/tingling, lower extremity swelling/pain. Was seen at urgent care and was referred to ED for further evaluation. Reports she was at [place], [place] prior to this onset via plane. No birth control use. Non-smoker. Denies any drug use. No familial hx of DVTs or blood clots int he past. No fevers, coughing, or sore throat. Mom reports pt had fully cardiology work up approx. 1 year ago due to hx of CAD in family, but all members were men that were over 51 y/o, work up was negative. 27 y/o female w/PMHx of obesity, depression on Prozac and Wegovy presents to ED c/o 1 week of chest tightness and SOB intermittently. Pt became concern this afternoon, was at the gym riding on the biker and within 5 minutes she had chest tightness, SOB, and sharp left sided chest pain that last for approx. 5 minutes, improved once she stopped exercising. No associated diaphoresis, N/V, fatigue, radiating pain, numbness/tingling, lower extremity swelling/pain. Was seen at urgent care and was referred to ED for further evaluation. Reports she was at [place], [place] prior to this onset via plane. No birth control use. Non-smoker. Denies any drug use. No familial hx of DVTs or blood clots int he past. No fevers, coughing, or sore throat. Mom reports pt had fully cardiology work up approx. 1 year ago due to hx of CAD in family, work up was negative.

## 2023-08-29 NOTE — ED ADULT NURSE NOTE - OBJECTIVE STATEMENT
pt c/o chest tightness for 5 days. Pt reports starting today she developed chest pain. Pt also endorses she had some SOB while the chest pain happened but since has resolved. Pt has hx of anxiety and depression but denies SI. Pt also denies any fevers, SOB or CP at this time.

## 2023-08-29 NOTE — ED ADULT TRIAGE NOTE - CHIEF COMPLAINT QUOTE
pt c/o chest pain/tightness x 1 week. pt states "when I was at the gym when I was exercising it started getting tight and I became sob". pt on wegony for weight loss. denies n/v/d/fever/chills. PMH: mono, weight loss 70 lbs.

## 2023-08-29 NOTE — ED ADULT NURSE NOTE - NSFALLUNIVINTERV_ED_ALL_ED
Bed/Stretcher in lowest position, wheels locked, appropriate side rails in place/Call bell, personal items and telephone in reach/Instruct patient to call for assistance before getting out of bed/chair/stretcher/Non-slip footwear applied when patient is off stretcher/[place] to call system/Physically safe environment - no spills, clutter or unnecessary equipment/Purposeful proactive rounding/Room/bathroom lighting operational, light cord in reach

## 2023-08-29 NOTE — ED STATDOCS - PATIENT PORTAL LINK FT
You can access the FollowMyHealth Patient Portal offered by [facility] by registering at the following website: [email or web address]. By joining [employer]’s FollowMyHealth portal, you will also be able to view your health information using other applications (apps) compatible with our system.

## 2024-03-29 ENCOUNTER — OFFICE (OUTPATIENT)
Facility: LOCATION | Age: 29
Setting detail: OPHTHALMOLOGY
End: 2024-03-29
Payer: COMMERCIAL

## 2024-03-29 ENCOUNTER — OFFICE (OUTPATIENT)
Facility: LOCATION | Age: 29
Setting detail: OPHTHALMOLOGY
End: 2024-03-29

## 2024-03-29 DIAGNOSIS — H53.10: ICD-10-CM

## 2024-03-29 DIAGNOSIS — H31.29: ICD-10-CM
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
## 2024-03-29 DIAGNOSIS — Z13.5: ICD-10-CM

## 2024-03-29 DIAGNOSIS — H35.413: ICD-10-CM

## 2024-03-29 DIAGNOSIS — H10.433: ICD-10-CM

## 2024-03-29 DIAGNOSIS — H52.13: ICD-10-CM

## 2024-03-29 DIAGNOSIS — G43.109: ICD-10-CM

## 2024-03-29 DIAGNOSIS — H43.393: ICD-10-CM

## 2024-03-29 PROCEDURE — 92014 COMPRE OPH EXAM EST PT 1/>: CPT | Performed by: OPTOMETRIST

## 2024-03-29 PROCEDURE — 92015 DETERMINE REFRACTIVE STATE: CPT | Performed by: OPTOMETRIST

## 2024-03-29 PROCEDURE — 92250 FUNDUS PHOTOGRAPHY W/I&R: CPT | Mod: GY | Performed by: OPTOMETRIST

## 2024-03-29 PROCEDURE — 10004 FNA BX W/O IMG GDN EA ADDL: CPT | Performed by: OPHTHALMOLOGY

## 2024-03-29 ASSESSMENT — LID EXAM ASSESSMENTS
OD_BLEPHARITIS: +1
OS_BLEPHARITIS: +1

## 2024-04-02 PROBLEM — Z13.5 ENCOUNTER FOR SCREENING FOR EYE AND EAR DISORDERS: Status: ACTIVE | Noted: 2024-03-29

## 2024-04-02 PROBLEM — H31.29 PERIPAPILLARY ATROPHY ; BOTH EYES: Status: ACTIVE | Noted: 2024-03-29

## 2024-04-02 ASSESSMENT — KERATOMETRY
OD_AXISANGLE_DEGREES: 030
OD_K1POWER_DIOPTERS: 42.00
OS_K1POWER_DIOPTERS: 42.50
METHOD_AUTO_MANUAL: AUTO
OS_K2POWER_DIOPTERS: 42.75
OS_AXISANGLE_DEGREES: 154
OD_K2POWER_DIOPTERS: 42.75

## 2024-04-02 ASSESSMENT — REFRACTION_MANIFEST
OD_CYLINDER: SPHERE
OU_VA: 20/20
OS_VA1: 20/20
OS_CYLINDER: SPHERE
OD_VA1: 20/20
OS_SPHERE: -6.25
OD_SPHERE: -6.00

## 2024-04-02 ASSESSMENT — REFRACTION_CURRENTRX
OD_VPRISM_DIRECTION: SV
OD_OVR_VA: 20/
OS_SPHERE: -6.00
OS_VPRISM_DIRECTION: SV
OS_AXIS: 120
OD_SPHERE: -5.75
OS_CYLINDER: -0.25
OS_OVR_VA: 20/
OD_CYLINDER: -0.50
OD_AXIS: 139

## 2024-09-30 ENCOUNTER — NON-APPOINTMENT (OUTPATIENT)
Age: 29
End: 2024-09-30

## 2025-01-06 ENCOUNTER — NON-APPOINTMENT (OUTPATIENT)
Age: 30
End: 2025-01-06

## 2025-07-02 NOTE — ED ADULT NURSE NOTE - THOUGHTS OF HOMICIDE/VIOLENCE TOWARDS OTHERS YN, MLM
[FreeTextEntry1] :  I, Jun Durbin, acted solely as a scribe for Dr. Dc on this date on 07/02/2025. 
No

## 2025-07-05 ENCOUNTER — OFFICE (OUTPATIENT)
Facility: LOCATION | Age: 30
Setting detail: OPHTHALMOLOGY
End: 2025-07-05
Payer: COMMERCIAL

## 2025-07-05 DIAGNOSIS — H10.433: ICD-10-CM

## 2025-07-05 DIAGNOSIS — H43.393: ICD-10-CM

## 2025-07-05 DIAGNOSIS — H43.813: ICD-10-CM

## 2025-07-05 DIAGNOSIS — H31.29: ICD-10-CM

## 2025-07-05 DIAGNOSIS — H52.13: ICD-10-CM

## 2025-07-05 DIAGNOSIS — G43.109: ICD-10-CM

## 2025-07-05 PROCEDURE — CONTACT LE COOPER-: Performed by: OPTOMETRIST

## 2025-07-05 PROCEDURE — 92250 FUNDUS PHOTOGRAPHY W/I&R: CPT | Performed by: OPTOMETRIST

## 2025-07-05 PROCEDURE — 92014 COMPRE OPH EXAM EST PT 1/>: CPT | Performed by: OPTOMETRIST

## 2025-07-05 PROCEDURE — 92015 DETERMINE REFRACTIVE STATE: CPT | Performed by: OPTOMETRIST

## 2025-07-05 ASSESSMENT — LID EXAM ASSESSMENTS
OS_BLEPHARITIS: +1
OD_BLEPHARITIS: +1

## 2025-07-05 ASSESSMENT — CONFRONTATIONAL VISUAL FIELD TEST (CVF)
OD_FINDINGS: FULL
OS_FINDINGS: FULL

## 2025-07-05 ASSESSMENT — TONOMETRY
OD_IOP_MMHG: 16
OS_IOP_MMHG: 16

## 2025-07-07 ASSESSMENT — REFRACTION_MANIFEST
OS_CYLINDER: SPHERE
OD_CYLINDER: SPHERE
OD_CYLINDER: SPHERE
OD_VA1: 20/20
OS_SPHERE: -6.25
OS_SPHERE: -6.25
OD_SPHERE: -6.00
OD_VA1: 20/20
OS_CYLINDER: SPHERE
OS_VA1: 20/20
OU_VA: 20/20
OS_VA1: 20/20
OU_VA: 20/20
OD_SPHERE: -6.00

## 2025-07-07 ASSESSMENT — REFRACTION_AUTOREFRACTION
OS_SPHERE: -6.75
OD_SPHERE: -6.75
OS_CYLINDER: +0.25
OD_AXIS: 158
OS_AXIS: 110
OD_CYLINDER: +0.50

## 2025-07-07 ASSESSMENT — REFRACTION_CURRENTRX
OD_SPHERE: -5.75
OS_AXIS: 120
OD_VPRISM_DIRECTION: SV
OD_AXIS: 139
OS_VPRISM_DIRECTION: SV
OS_CYLINDER: -0.25
OD_OVR_VA: 20/
OS_SPHERE: -6.00
OS_OVR_VA: 20/
OD_CYLINDER: -0.50

## 2025-07-07 ASSESSMENT — KERATOMETRY
METHOD_AUTO_MANUAL: AUTO
OD_K1POWER_DIOPTERS: 42.00
OS_K2POWER_DIOPTERS: 42.75
OS_K1POWER_DIOPTERS: 42.50
OD_K2POWER_DIOPTERS: 42.75
OD_AXISANGLE_DEGREES: 030
OS_AXISANGLE_DEGREES: 154

## 2025-07-07 ASSESSMENT — VISUAL ACUITY
OS_BCVA: 20/20
OD_BCVA: 20/20